# Patient Record
Sex: MALE | Race: WHITE | Employment: FULL TIME | URBAN - METROPOLITAN AREA
[De-identification: names, ages, dates, MRNs, and addresses within clinical notes are randomized per-mention and may not be internally consistent; named-entity substitution may affect disease eponyms.]

---

## 2023-03-15 ENCOUNTER — OFFICE VISIT (OUTPATIENT)
Dept: ENT CLINIC | Age: 43
End: 2023-03-15
Payer: COMMERCIAL

## 2023-03-15 VITALS — RESPIRATION RATE: 16 BRPM | BODY MASS INDEX: 28.88 KG/M2 | HEIGHT: 67 IN | WEIGHT: 184 LBS

## 2023-03-15 DIAGNOSIS — J34.3 HYPERTROPHY OF BOTH INFERIOR NASAL TURBINATES: ICD-10-CM

## 2023-03-15 DIAGNOSIS — J35.3 ADENOTONSILLAR HYPERTROPHY: ICD-10-CM

## 2023-03-15 DIAGNOSIS — G47.33 OBSTRUCTIVE SLEEP APNEA SYNDROME: Primary | ICD-10-CM

## 2023-03-15 PROCEDURE — 99204 OFFICE O/P NEW MOD 45 MIN: CPT | Performed by: OTOLARYNGOLOGY

## 2023-03-15 PROCEDURE — 31231 NASAL ENDOSCOPY DX: CPT | Performed by: OTOLARYNGOLOGY

## 2023-03-15 RX ORDER — FLUTICASONE PROPIONATE 50 MCG
2 SPRAY, SUSPENSION (ML) NASAL DAILY
Qty: 48 G | Refills: 1 | Status: SHIPPED | OUTPATIENT
Start: 2023-03-15

## 2023-03-15 RX ORDER — METFORMIN HYDROCHLORIDE 500 MG/1
1 TABLET, EXTENDED RELEASE ORAL DAILY
COMMUNITY
Start: 2023-02-14

## 2023-03-15 NOTE — PROGRESS NOTES
Steve Gill  [address]  P: [phone]          OFFICE VISIT       3/15/2023    Chief Complaint   Patient presents with    New Patient     Sleep Apnea       HPI:  Urmila Chan is a 40 y.o. male seen in consultation today at the request of Alma Sharma. MD [place] for   Chief Complaint   Patient presents with    New Patient     Sleep Apnea   . Onset of symptoms: Years  Frequency (daily, weekly,every night, every morning, constant): constant/ nightly  Alleviating factors or medications: none. Associated with pain and if so scale (1-10): 0  Is condition becoming progressively worse: yes  Associated symptoms within upper aerodigestive tract: nasal congestion, snoring, trouble breathing through nose, fatigue, witnessed apnea, restless sleep. Previous history of nasal airway surgery in [facility] in 2006. States he benefited for approximately 1 year and then nasal obstruction return. He presents to consultation today wearing a Breathe Right strip over the nose. Current Outpatient Medications:     metFORMIN (GLUCOPHAGE-XR) 500 MG extended release tablet, Take 1 tablet by mouth daily, Disp: , Rfl:     No past medical history on file. No past surgical history on file. No family history on file.      Social History     Socioeconomic History    Marital status: Unknown     Spouse name: Not on file    Number of children: Not on file    Years of education: Not on file    Highest education level: Not on file   Occupational History    Not on file   Tobacco Use    Smoking status: Never    Smokeless tobacco: Never   Substance and Sexual Activity    Alcohol use: Not on file    Drug use: Not on file    Sexual activity: Not on file   Other Topics Concern    Not on file   Social History Narrative    Not on file     Social Determinants of Health     Financial Resource Strain: Not on file   Food Insecurity: Not on file   Transportation Needs: Not on file   Physical Activity: Not on file   Stress: Not on file   Social Connections: Not on file   Intimate Partner Violence: Not on file   Housing Stability: Not on file        No Known Allergies     ROS:  The patient was asked specifically about the following. General: Fever, chills, night sweats, unexplained weight loss or weight gain  Eyes: Blurry vision, double vision, floaters, loss or decrease of peripheral vision.    Ears: Difficulty hearing, ringing or buzzing in the ears, ear fullness, frequent ear infections, ear pain or drainage, hearing aid  Nose/Face: Drainage, frequent nosebleeds, sinus pain, pressure or fullness, difficulty breathing through the nose, facial pain, swelling or masses  Mouth: Sores in mouth, tongue soreness, bleeding gums, wears dentures, growths in mouth  Throat: Sore throat, hoarseness, difficulty swallowing, lump in throat, sore throat frequency  Respiratory: Difficulty breathing, frequent cough, productive cough, wheezing, recent abnormal chest X-RAY  Cardiovascular: Blocked arteries, chest pain, shortness of breath, abnormal heart beat, pacemaker  Digestive: Frequent indigestion, burning in throat,chest or stomach after a meal, burning that wakes you in the night, abdominal pain  Neuropsychiatric: Headaches, seizures, facial numbness or tingling, weakness, depressed mood or feeling sad, anxiety, inability to cope  Hematologic/Lymphatic: Anemia, easy bruising or bleeding, swollen glands, transfusions  Allergy/Immunologic: Hay fever, environmental allergies, food allergies, allergy testing, immunodeficiency  Endocrine: Heat or cold intolerance, fatigue, heart racing, profuse sweating, thyroid swelling, over or underactive thyroid, pituitary problems  Other: other problems not mentioned  All systems were negative with the exception of the following pertinent positives:   Restless sleep  Sleep disturbance  Snoring       Vitals:   Vitals:    03/15/23 1025   Resp: 16        PHYSICAL EXAM: A comprehensive physical exam was performed in the following manner. Unless otherwise indicated in pertinent findings section below, findings were within normal limits. APPEARANCE:   General assessment for development status, nutritional status, and for pain or distress was performed. COMMUNICATION:   Ability to communicate effectively including vocal quality was assessed. HEAD AND FACE:   General exam of the face and scalp for any gross masses or lesions was performed. Palpation of the sinuses for any sign of pain or tenderness was performed. Facial nerve examination for any facial mimetic muscle asymmetry at rest and with effort was performed. Palpation of the submandibular and parotid glands was performed to assess for asymmetry, nodule or masses. EYES:   Extraocular motility was assessed for medial, lateral, superior and inferior rectus function as well as inferior and superior oblique function. The conjunctiva and eyelids were examined for injection, pallor or swelling. Pupil reactivity and accomodation was assessed. EARS:   External inspection and palpation of the auricular skin and cartilage was performed for lesion or abnormality. Otoscopy of the external auditory and tympanic membranes was performed to assess for patency, induration, erythema, tympanic membrane health and mobility and the presence of any middle ear fluid or abnormality. Speech reception thresholds were grossly assessed through communication at normal conversational levels. NOSE:   External exam for gross deformity of the nasal bones and upper and lower lateral cartilages was performed. Anterior rhinoscopy was performed to assess the patency of the nasal airway, the anatomy of the nasal septum and turbinates as well as the nasal valve region, and the general mucosal health. The presence of any rhinorrhea and its consistency was noted.    Any abnormalities requiring further evaluation by nasal endoscopy will be described below. MOUTH/PHARYNX/LARYNX:   Assessment of the lips, gums, hard/soft palate, tongue, tonsillar fossae and oropharynx for mass, lesions or mucosal abnormalities was performed. The base of tongue and floor of mouth were inspected for lesions and palpated for mass or nodularity. Mirror exam of the larynx to assess for vocal fold mobility and any gross mass or lesion was performed. Mirror exam of the nasopharynx was attempted, to assess for gross mass or lesion of the nasopharynx or any of adenoidal hyperplasia or inflammation. Any abnormalities requiring further examination by flexible endoscopy will be described below. NECK:   Gross inspection of the neck was performed to assess for mass or asymmetry. Palpation of the level I-IV lymph nodes was performed to assess for any grossly enlarged, or abnormally firm lymphadenopathy. The skin of the neck was examined for any induration or swelling and palpated for any crepitus. The larynx and trachea were palpated to assess position in the neck and continuity. The thyroid was palpated to assess for any mass, nodularity or asymmetry. NEURO/PSYCH:   Cranial nerves II-XII were grossly assessed for any weakness or asymmetry. If indicated, CN I was assessed by administration of a standardized smell test (UPSIT). Orientation to person, place and time was assessed. Mood and affect were assessed. RESPIRATION:   Respiratory effort was assessed for increased work of breathing and inspiratory or expiratory wheezing. Chest expansion was noted for symmetry. CARDIOVASCULAR:   Gross examination for peripheral vascular edema and jugular venous distension was performed. PERTINENT PHYSICAL EXAM FINDINGS - examination for above was grossly within normal limits with exceptions listed below: On anterior rhinoscopy the inferior turbinates were severely hypertrophic. Modest response to decongestant therapy was noted.   Due to the limitations of anterior rhinoscopy, nasal endoscopy was performed.  The nasal septum is essentially midline.  I see no active sinus exudates.  There is no evidence for nasal polyposis.  Adenoid pad is prominent, however not obstructing the choanae.  Bilateral tonsils are 3+ hypertrophic.  Significant posterior extension as viewed from above.  Remainder of the larynx is within normal limits.  Primarily lateral collapse on John's maneuver        Studies Reviewed  Referral documentation    PROCEDURES:  Informed consent was obtained. The bilateral sinonasal passages were topicalized with afrin and ponticaine sprays. The endoscope was used to evaluate each nasal passage, middle meatus, frontal infindibulum, sphenoethmoid recess and choanae. The patient tolerated the procedure well without complication. Findings are documented above.       ASSESSMENT AND PLAN:      ICD-10-CM    1. Obstructive sleep apnea syndrome  G47.33       2. Hypertrophy of both inferior nasal turbinates  J34.3       3. Adenotonsillar hypertrophy  J35.3            Discussed risks benefits and alternatives to bilateral submucous resection of the inferior nasal turbinates in conjunction with adenotonsillectomy for an indication of sleep disordered breathing.  Patient with sleep study pending.  He understands that despite surgery he may ultimately require CPAP titration.  He demonstrated a good understanding and desires to proceed with surgery this time.  Please schedule accordingly.    Please cc Dr. Liane Ryan MD.  Thank you      The patient diagnoses and management plan were discussed at length. They  demonstrated an understanding of the plan and stated that all questions were answered to their satisfaction.       PATIENT EDUCATION / INSTRUCTIONS GIVEN FOR:  T&A, HAYES

## 2023-03-20 ENCOUNTER — TELEPHONE (OUTPATIENT)
Dept: ENT CLINIC | Age: 43
End: 2023-03-20

## 2023-03-20 NOTE — TELEPHONE ENCOUNTER
I called pt to schedule surgery He is not ready to schedule yet. He will speak to his work and call us back.  Thank you